# Patient Record
Sex: MALE | URBAN - METROPOLITAN AREA
[De-identification: names, ages, dates, MRNs, and addresses within clinical notes are randomized per-mention and may not be internally consistent; named-entity substitution may affect disease eponyms.]

---

## 2020-06-27 ENCOUNTER — HOSPITAL ENCOUNTER (EMERGENCY)
Facility: MEDICAL CENTER | Age: 85
End: 2020-06-28
Attending: EMERGENCY MEDICINE

## 2020-06-27 DIAGNOSIS — F10.920 ALCOHOLIC INTOXICATION WITHOUT COMPLICATION (HCC): ICD-10-CM

## 2020-06-27 DIAGNOSIS — R41.82 ALTERED MENTAL STATUS, UNSPECIFIED ALTERED MENTAL STATUS TYPE: ICD-10-CM

## 2020-06-27 PROCEDURE — 93005 ELECTROCARDIOGRAM TRACING: CPT | Performed by: EMERGENCY MEDICINE

## 2020-06-27 PROCEDURE — 99284 EMERGENCY DEPT VISIT MOD MDM: CPT

## 2020-06-28 VITALS
DIASTOLIC BLOOD PRESSURE: 68 MMHG | HEIGHT: 70 IN | RESPIRATION RATE: 18 BRPM | TEMPERATURE: 97.7 F | SYSTOLIC BLOOD PRESSURE: 100 MMHG | HEART RATE: 80 BPM | WEIGHT: 145 LBS | OXYGEN SATURATION: 100 % | BODY MASS INDEX: 20.76 KG/M2

## 2020-06-28 LAB
ALBUMIN SERPL BCP-MCNC: 4.2 G/DL (ref 3.2–4.9)
ALBUMIN/GLOB SERPL: 2.2 G/DL
ALP SERPL-CCNC: 51 U/L (ref 30–99)
ALT SERPL-CCNC: 15 U/L (ref 2–50)
ANION GAP SERPL CALC-SCNC: 14 MMOL/L (ref 7–16)
AST SERPL-CCNC: 14 U/L (ref 12–45)
BASOPHILS # BLD AUTO: 0.7 % (ref 0–1.8)
BASOPHILS # BLD: 0.06 K/UL (ref 0–0.12)
BILIRUB SERPL-MCNC: 0.3 MG/DL (ref 0.1–1.5)
BUN SERPL-MCNC: 12 MG/DL (ref 8–22)
CALCIUM SERPL-MCNC: 8.3 MG/DL (ref 8.5–10.5)
CHLORIDE SERPL-SCNC: 110 MMOL/L (ref 96–112)
CO2 SERPL-SCNC: 22 MMOL/L (ref 20–33)
CREAT SERPL-MCNC: 0.65 MG/DL (ref 0.5–1.4)
EKG IMPRESSION: NORMAL
EOSINOPHIL # BLD AUTO: 0.13 K/UL (ref 0–0.51)
EOSINOPHIL NFR BLD: 1.5 % (ref 0–6.9)
ERYTHROCYTE [DISTWIDTH] IN BLOOD BY AUTOMATED COUNT: 38.2 FL (ref 35.9–50)
ETHANOL BLD-MCNC: 330.4 MG/DL (ref 0–10.1)
GLOBULIN SER CALC-MCNC: 1.9 G/DL (ref 1.9–3.5)
GLUCOSE BLD-MCNC: 123 MG/DL (ref 65–99)
GLUCOSE SERPL-MCNC: 119 MG/DL (ref 65–99)
HCT VFR BLD AUTO: 34 % (ref 42–52)
HGB BLD-MCNC: 10.5 G/DL (ref 14–18)
IMM GRANULOCYTES # BLD AUTO: 0.04 K/UL (ref 0–0.11)
IMM GRANULOCYTES NFR BLD AUTO: 0.5 % (ref 0–0.9)
LYMPHOCYTES # BLD AUTO: 1.68 K/UL (ref 1–4.8)
LYMPHOCYTES NFR BLD: 19.4 % (ref 22–41)
MCH RBC QN AUTO: 20.3 PG (ref 27–33)
MCHC RBC AUTO-ENTMCNC: 30.9 G/DL (ref 33.7–35.3)
MCV RBC AUTO: 65.6 FL (ref 81.4–97.8)
MONOCYTES # BLD AUTO: 0.32 K/UL (ref 0–0.85)
MONOCYTES NFR BLD AUTO: 3.7 % (ref 0–13.4)
NEUTROPHILS # BLD AUTO: 6.43 K/UL (ref 1.82–7.42)
NEUTROPHILS NFR BLD: 74.2 % (ref 44–72)
NRBC # BLD AUTO: 0 K/UL
NRBC BLD-RTO: 0 /100 WBC
PLATELET # BLD AUTO: 266 K/UL (ref 164–446)
PMV BLD AUTO: 9.7 FL (ref 9–12.9)
POTASSIUM SERPL-SCNC: 3.7 MMOL/L (ref 3.6–5.5)
PROT SERPL-MCNC: 6.1 G/DL (ref 6–8.2)
RBC # BLD AUTO: 5.18 M/UL (ref 4.7–6.1)
SODIUM SERPL-SCNC: 146 MMOL/L (ref 135–145)
WBC # BLD AUTO: 8.7 K/UL (ref 4.8–10.8)

## 2020-06-28 PROCEDURE — 80053 COMPREHEN METABOLIC PANEL: CPT

## 2020-06-28 PROCEDURE — 82962 GLUCOSE BLOOD TEST: CPT

## 2020-06-28 PROCEDURE — 80307 DRUG TEST PRSMV CHEM ANLYZR: CPT

## 2020-06-28 PROCEDURE — 85025 COMPLETE CBC W/AUTO DIFF WBC: CPT

## 2020-06-28 NOTE — ED NOTES
Per ERP patient had no gag reflex and was moved to Red 5 for airway management, in transit to red 5 patient talking, saying his name is Billygoat. Patient making jokes as I place him on cardiac monitor. ERP at bedside for reeval

## 2020-06-28 NOTE — PROGRESS NOTES
0438AM care s/o from nighttime ERP to disposition the patient and recheck after he was sober.  However, prior to the prior ERP, finishing his shift, this patient was discharged.

## 2020-06-28 NOTE — ED NOTES
Patient resting with eyes closed. Equal chest rise and fall. Oxygen levels 99% on 2L  Patient difficult to arouse. Responds to painful stimuli but does not answer questions

## 2020-06-28 NOTE — ED PROVIDER NOTES
"ED Provider Note    Scribed for Chase Hinkle M.D. by Sepideh Pacheco. 6/27/2020, 11:43 PM.    Means of arrival: ambulance  History obtained from: EMS report  History limited by: patient's altered level of consciousness    CHIEF COMPLAINT  Chief Complaint   Patient presents with   • Alcohol Intoxication     patient BIBEMS from [facility], faculty called after saying he was asleep at the slots, patient +etoh on board. Patient unable to answer questions due to intoxication.       BRYN Yeboah is a 89 y.o. male who presents to the Emergency Department for evaluation of altered level of consciousness. Patient was at [facility] earlier this evening, sitting at a slot machine and drinking heavily. He was then found  unconscious by staff at the [facility] and EMS was contacted. At this time, patient is unable to answer any questions. No indications of a traumatic event. No reports of witnessed falls. Per nursing note, patient will wake up and answer \"Fuck you, I love you,\" when asked any questions, then go back to sleep.    Further history limited secondary to patient's altered level of consciousness.    PPE Note: I personally donned full PPE for all patient encounters during this visit, including being clean-shaven with an N95 respirator mask, gloves, gown, and goggles.     Scribe remained outside the patient's room and did not have any contact with the patient for the duration of patient encounter.     REVIEW OF SYSTEMS  ROS limited secondary to patient's altered level of consciousness    PAST MEDICAL HISTORY   unable to be obtained secondary to patient's altered level of consciousness    SURGICAL HISTORY  patient denies any surgical history    SOCIAL HISTORY  Social History     Tobacco Use   • Smoking status: Unknown If Ever Smoked   Substance Use Topics   • Alcohol use: Yes   • Drug use: Not Currently      Social History     Substance and Sexual Activity   Drug Use Not Currently       FAMILY " "HISTORY  History reviewed. No pertinent family history.    CURRENT MEDICATIONS     No current facility-administered medications for this encounter.        ALLERGIES  Not on File    PHYSICAL EXAM  VITAL SIGNS: BP 98/63   Pulse 87   Temp 36.5 °C (97.7 °F) (Temporal)   Resp 16   Ht 1.778 m (5' 10\")   Wt 65.8 kg (145 lb)   SpO2 91%   BMI 20.81 kg/m²     Constitutional: Well developed, Well nourished, somnolent  HENT: Normocephalic, Atraumatic, Oropharynx moist.   Eyes: Conjunctiva normal, No discharge.   Neck: Supple, No stridor, no indications of trauma  Cardiovascular: Normal heart rate, Normal rhythm, No murmurs, equal pulses.   Pulmonary: Normal breath sounds, No respiratory distress, No wheezing, No rales, No rhonchi.  Chest: No chest wall deformity.   Abdomen:Soft, No masses  Back: atraumatic  Musculoskeletal: No major deformities noted  Skin: Warm, Dry, No erythema, No rash.   Neurologic: patient is somnolent and appears intoxicated. Will awaken to painful stimuli, then fall back asleep. Moves all extremities spontaneously.  Psychiatric: unable to be assessed    LABS  Results for orders placed or performed during the hospital encounter of 06/27/20   CBC WITH DIFFERENTIAL   Result Value Ref Range    WBC 8.7 4.8 - 10.8 K/uL    RBC 5.18 4.70 - 6.10 M/uL    Hemoglobin 10.5 (L) 14.0 - 18.0 g/dL    Hematocrit 34.0 (L) 42.0 - 52.0 %    MCV 65.6 (L) 81.4 - 97.8 fL    MCH 20.3 (L) 27.0 - 33.0 pg    MCHC 30.9 (L) 33.7 - 35.3 g/dL    RDW 38.2 35.9 - 50.0 fL    Platelet Count 266 164 - 446 K/uL    MPV 9.7 9.0 - 12.9 fL    Neutrophils-Polys 74.20 (H) 44.00 - 72.00 %    Lymphocytes 19.40 (L) 22.00 - 41.00 %    Monocytes 3.70 0.00 - 13.40 %    Eosinophils 1.50 0.00 - 6.90 %    Basophils 0.70 0.00 - 1.80 %    Immature Granulocytes 0.50 0.00 - 0.90 %    Nucleated RBC 0.00 /100 WBC    Neutrophils (Absolute) 6.43 1.82 - 7.42 K/uL    Lymphs (Absolute) 1.68 1.00 - 4.80 K/uL    Monos (Absolute) 0.32 0.00 - 0.85 K/uL    Eos " (Absolute) 0.13 0.00 - 0.51 K/uL    Baso (Absolute) 0.06 0.00 - 0.12 K/uL    Immature Granulocytes (abs) 0.04 0.00 - 0.11 K/uL    NRBC (Absolute) 0.00 K/uL   COMP METABOLIC PANEL   Result Value Ref Range    Sodium 146 (H) 135 - 145 mmol/L    Potassium 3.7 3.6 - 5.5 mmol/L    Chloride 110 96 - 112 mmol/L    Co2 22 20 - 33 mmol/L    Anion Gap 14.0 7.0 - 16.0    Glucose 119 (H) 65 - 99 mg/dL    Bun 12 8 - 22 mg/dL    Creatinine 0.65 0.50 - 1.40 mg/dL    Calcium 8.3 (L) 8.5 - 10.5 mg/dL    AST(SGOT) 14 12 - 45 U/L    ALT(SGPT) 15 2 - 50 U/L    Alkaline Phosphatase 51 30 - 99 U/L    Total Bilirubin 0.3 0.1 - 1.5 mg/dL    Albumin 4.2 3.2 - 4.9 g/dL    Total Protein 6.1 6.0 - 8.2 g/dL    Globulin 1.9 1.9 - 3.5 g/dL    A-G Ratio 2.2 g/dL   DIAGNOSTIC ALCOHOL   Result Value Ref Range    Diagnostic Alcohol 330.4 (H) 0.0 - 10.1 mg/dL   ESTIMATED GFR   Result Value Ref Range    GFR If African American >60 >60 mL/min/1.73 m 2    GFR If Non African American >60 >60 mL/min/1.73 m 2   ACCU-CHEK GLUCOSE   Result Value Ref Range    Glucose - Accu-Ck 123 (H) 65 - 99 mg/dL       All labs reviewed by me.    EKG  12 Lead EKG interpreted by me as above    COURSE & MEDICAL DECISION MAKING  Pertinent Labs & Imaging studies reviewed. (See chart for details)    PPE Note: I personally donned full PPE for all patient encounters during this visit, including being clean-shaven with an N95 respirator mask, gloves, gown, and goggles.     Scribe remained outside the patient's room and did not have any contact with the patient for the duration of patient encounter.     11:43 PM - Patient seen and examined at bedside. Ordered CBC, CMP, diagnostic alcohol and EKG to evaluate his symptoms.  Per nursing staff, patient was GCS of 14 upon arrival, and able to be woken up. Initially seen by a medical student this evening, who was able to wake the patient up with sternal rub as well. Upon my follow up evaluation, patient now has no gag reflex and does not  respond to sternal rub. Respiratory has been called.    12:08 AM Upon arrival to a more acute care room. Patient is now awake and speaking. He is able to speak profanity and inform us that he has been drinking, prior to drifting back off to sleep. Will continue to monitor saturations. Will not intubate at this time.    Patient reexamined at 5 AM he is now able to ambulate and talk without difficulty.  Patient would like to be discharged he has no other medical complaints    Medical Decision Making: Patient presents with significant alcohol intoxication initially was almost intubated for not protecting his airway but then regained consciousness and was protecting his airway.  He was then watched for several hours until he metabolized his alcohol and was able to ambulate.    FINAL IMPRESSION  1. Alcoholic intoxication without complication (HCC)    2. Altered mental status, unspecified altered mental status type          I, Sepideh Pacheco (Scribe), am scribing for, and in the presence of, Chase Hinkle M.D.    Electronically signed by: Sepideh Pacheco (Scribe), 6/27/2020    IChase M.D. personally performed the services described in this documentation, as scribed by Sepideh Pacheco in my presence, and it is both accurate and complete.    The note accurately reflects work and decisions made by me.  Chase Hinkle M.D.  6/28/2020  5:11 AM

## 2020-06-28 NOTE — ED TRIAGE NOTES
"Chief Complaint   Patient presents with   • Alcohol Intoxication     patient BIBEMS from [facility], faculty called after saying he was asleep at the slots, patient +etoh on board. Patient unable to answer questions due to intoxication.     BP 98/63   Pulse 87   Temp 36.5 °C (97.7 °F) (Temporal)   Resp 16   Ht 1.778 m (5' 10\")   Wt 65.8 kg (145 lb)   SpO2 91%   BMI 20.81 kg/m²     Patient initial BP on scene in 90s was given 300cc of NS. Patient will only say \"Fuck you, I love you\" Patient quickly falls back to sleep. Patient unable to answer any other questions at this time. Patient appears intoxicated and was last seen at the [facility] drinking alcohol, no I.D. found with patient.  "